# Patient Record
Sex: FEMALE | Race: WHITE
[De-identification: names, ages, dates, MRNs, and addresses within clinical notes are randomized per-mention and may not be internally consistent; named-entity substitution may affect disease eponyms.]

---

## 2019-07-15 ENCOUNTER — HOSPITAL ENCOUNTER (OUTPATIENT)
Dept: HOSPITAL 35 - BC | Age: 79
End: 2019-07-15
Attending: INTERNAL MEDICINE
Payer: COMMERCIAL

## 2019-07-15 DIAGNOSIS — Z12.31: Primary | ICD-10-CM

## 2019-11-21 ENCOUNTER — HOSPITAL ENCOUNTER (INPATIENT)
Dept: HOSPITAL 35 - 2N | Age: 79
LOS: 2 days | Discharge: HOME | DRG: 441 | End: 2019-11-23
Attending: INTERNAL MEDICINE | Admitting: INTERNAL MEDICINE
Payer: COMMERCIAL

## 2019-11-21 VITALS — SYSTOLIC BLOOD PRESSURE: 146 MMHG | DIASTOLIC BLOOD PRESSURE: 55 MMHG

## 2019-11-21 VITALS — BODY MASS INDEX: 24.11 KG/M2 | WEIGHT: 136.1 LBS | HEIGHT: 63 IN

## 2019-11-21 VITALS — DIASTOLIC BLOOD PRESSURE: 45 MMHG | SYSTOLIC BLOOD PRESSURE: 116 MMHG

## 2019-11-21 DIAGNOSIS — Z80.0: ICD-10-CM

## 2019-11-21 DIAGNOSIS — R18.8: ICD-10-CM

## 2019-11-21 DIAGNOSIS — R68.81: ICD-10-CM

## 2019-11-21 DIAGNOSIS — E43: ICD-10-CM

## 2019-11-21 DIAGNOSIS — K74.60: ICD-10-CM

## 2019-11-21 DIAGNOSIS — K75.81: Primary | ICD-10-CM

## 2019-11-21 DIAGNOSIS — Z88.2: ICD-10-CM

## 2019-11-21 DIAGNOSIS — R91.1: ICD-10-CM

## 2019-11-21 DIAGNOSIS — Z90.49: ICD-10-CM

## 2019-11-21 DIAGNOSIS — I10: ICD-10-CM

## 2019-11-21 DIAGNOSIS — E78.5: ICD-10-CM

## 2019-11-21 DIAGNOSIS — R63.0: ICD-10-CM

## 2019-11-21 DIAGNOSIS — R63.4: ICD-10-CM

## 2019-11-21 DIAGNOSIS — E78.00: ICD-10-CM

## 2019-11-21 DIAGNOSIS — Z82.49: ICD-10-CM

## 2019-11-21 DIAGNOSIS — I87.2: ICD-10-CM

## 2019-11-21 DIAGNOSIS — I45.10: ICD-10-CM

## 2019-11-21 DIAGNOSIS — Z96.642: ICD-10-CM

## 2019-11-21 DIAGNOSIS — E03.9: ICD-10-CM

## 2019-11-21 DIAGNOSIS — Z85.038: ICD-10-CM

## 2019-11-21 DIAGNOSIS — K21.9: ICD-10-CM

## 2019-11-21 DIAGNOSIS — Z88.8: ICD-10-CM

## 2019-11-21 DIAGNOSIS — Z90.710: ICD-10-CM

## 2019-11-21 LAB
ALBUMIN SERPL-MCNC: 2.2 G/DL (ref 3.4–5)
ALT SERPL-CCNC: 32 U/L (ref 30–65)
ANION GAP SERPL CALC-SCNC: 4 MMOL/L (ref 7–16)
AST SERPL-CCNC: 65 U/L (ref 15–37)
BASOPHILS NFR BLD AUTO: 1 % (ref 0–2)
BILIRUB DIRECT SERPL-MCNC: 0.7 MG/DL
BILIRUB SERPL-MCNC: 1.5 MG/DL
BUN SERPL-MCNC: 13 MG/DL (ref 7–18)
CALCIUM SERPL-MCNC: 7.9 MG/DL (ref 8.5–10.1)
CHLORIDE SERPL-SCNC: 97 MMOL/L (ref 98–107)
CO2 SERPL-SCNC: 34 MMOL/L (ref 21–32)
CREAT SERPL-MCNC: 0.7 MG/DL (ref 0.6–1)
EOSINOPHIL NFR BLD: 8 % (ref 0–3)
ERYTHROCYTE [DISTWIDTH] IN BLOOD BY AUTOMATED COUNT: 14.8 % (ref 10.5–14.5)
FERRITIN SERPL-MCNC: 362 NG/ML (ref 8–252)
GLUCOSE SERPL-MCNC: 90 MG/DL (ref 74–106)
GRANULOCYTES NFR BLD MANUAL: 52 % (ref 36–66)
HCT VFR BLD CALC: 40.8 % (ref 37–47)
HGB BLD-MCNC: 13.7 GM/DL (ref 12–15)
INR PPP: 1.2
IRON SERPL-MCNC: 78 UG/DL (ref 50–170)
LYMPHOCYTES NFR BLD AUTO: 23 % (ref 24–44)
MCH RBC QN AUTO: 34.4 PG (ref 26–34)
MCHC RBC AUTO-ENTMCNC: 33.6 G/DL (ref 28–37)
MCV RBC: 102.4 FL (ref 80–100)
MONOCYTES NFR BLD: 16 % (ref 1–8)
NEUTROPHILS # BLD: 3.5 THOU/UL (ref 1.4–8.2)
PLATELET # BLD: 164 THOU/UL (ref 150–400)
POTASSIUM SERPL-SCNC: 3.1 MMOL/L (ref 3.5–5.1)
PROT SERPL-MCNC: 7.2 G/DL (ref 6.4–8.2)
PROTHROMBIN TIME: 12.7 SECONDS (ref 9.3–11.4)
RBC # BLD AUTO: 3.98 MIL/UL (ref 4.2–5)
RBC MORPH BLD: NORMAL
SAO2 % BLD FROM PO2: 43 % (ref 20–39)
SODIUM SERPL-SCNC: 135 MMOL/L (ref 136–145)
TIBC SERPL-MCNC: 183 UG/DL (ref 250–450)
TSH SERPL-ACNC: 7.87 UIU/ML (ref 0.36–3.74)
WBC # BLD AUTO: 6.8 THOU/UL (ref 4–11)

## 2019-11-21 PROCEDURE — 10081 I&D PILONIDAL CYST COMP: CPT

## 2019-11-21 NOTE — NUR
80 YO FEMALE DIRECT ADMIT FROM DR. DE JESUS'S OFFICE TO ROOM 200. ADMISSION
ASSESSMENT COMPLETED AND CHARTED, ALERT AND ORIENTED X 4, NO COMPLAINTS OF
PAIN, SON AT BEDSIDE, NO NEEDS VOICED, WILL CONTINUE TO MONITOR

## 2019-11-22 VITALS — SYSTOLIC BLOOD PRESSURE: 102 MMHG | DIASTOLIC BLOOD PRESSURE: 51 MMHG

## 2019-11-22 VITALS — SYSTOLIC BLOOD PRESSURE: 101 MMHG | DIASTOLIC BLOOD PRESSURE: 41 MMHG

## 2019-11-22 VITALS — DIASTOLIC BLOOD PRESSURE: 52 MMHG | SYSTOLIC BLOOD PRESSURE: 134 MMHG

## 2019-11-22 VITALS — SYSTOLIC BLOOD PRESSURE: 123 MMHG | DIASTOLIC BLOOD PRESSURE: 56 MMHG

## 2019-11-22 VITALS — SYSTOLIC BLOOD PRESSURE: 105 MMHG | DIASTOLIC BLOOD PRESSURE: 54 MMHG

## 2019-11-22 LAB
ANION GAP SERPL CALC-SCNC: 4 MMOL/L (ref 7–16)
BF MACROPHAGE: 9
BF NEUTROPHILS: 12
BF NUCLEATED CELLS: 182
BF RBC: 370
BUN SERPL-MCNC: 12 MG/DL (ref 7–18)
CALCIUM SERPL-MCNC: 8.2 MG/DL (ref 8.5–10.1)
CERULOPLASMIN SERPL-MCNC: 29.5 MG/DL (ref 19–39)
CHLORIDE SERPL-SCNC: 99 MMOL/L (ref 98–107)
CLARITY UR: (no result)
CO2 SERPL-SCNC: 34 MMOL/L (ref 21–32)
COLOR UR: YELLOW
CREAT SERPL-MCNC: 0.6 MG/DL (ref 0.6–1)
GLUCOSE SERPL-MCNC: 83 MG/DL (ref 74–106)
HCV AB SER IA-ACNC: 0.1 (ref 0–0.9)
POTASSIUM SERPL-SCNC: 3.1 MMOL/L (ref 3.5–5.1)
SODIUM SERPL-SCNC: 137 MMOL/L (ref 136–145)
SOURCE: (no result)
SPECIMEN VOL 24H UR: 35 ML

## 2019-11-22 PROCEDURE — 0W9G3ZZ DRAINAGE OF PERITONEAL CAVITY, PERCUTANEOUS APPROACH: ICD-10-PCS | Performed by: RADIOLOGY

## 2019-11-22 NOTE — 2DMMODE
[facility]
[address]
[place]  [address]
Phone:  [phone] 2 D/M-MODE ECHOCARDIOGRAM     
_______________________________________________________________________________
 
Name:            ABDOULAYE KEANE CARLOS              Room #:        200-I       ADM IN
..#:           [id number]          Account #:     [id number]  
Admission:       19         Attend Phys:   Narciso Boland,
Discharge:                  Date of Birth: [birth year]  
                         Report #:      [id number]
        [id number]
_______________________________________________________________________________
THIS REPORT FOR:   //name//                          
 
 
--------------- APPROVED REPORT --------------
 
 
Study performed:  2019 06:21:27
 
EXAM: Limited 2D, Doppler, and color-flow 
Echocardiogram 
      Status:  routine
 
      BSA:         1.65
HR: 68 bpm BP:          102/51 mmHg 
Rhythm: NSR     
 
Other Information 
Study Quality: Good
 
Indications
Limited follow up echo for shortness of breath, leg edema. 
Hx: RBBB, HTN, HLP. (Complete echo done 10/18/19)
 
2D Dimensions
 LVOT Diam:  18.17 (18-24mm) 
 
Aortic Valve
AoV Peak Franky.:  2.19 m/s 
AO Peak Gr.:  19.18 mmHg LVOT Max P.63 mmHg
AO Mean Gr.:  11.27 mmHg 
AO V2 Mean:  1.60 m/s  LVOT Max V:  1.55 m/s
AO V2 VTI:  53.94 cm  
LAMAR Vmax: 1.84 cm2  
 
Tricuspid Valve
TR Peak Franky.:  2.72 m/s  RAP Estimate:  5.00 mmHg
TR Peak Gr.:  30.00 mmHg 
    PA Pressure:  35.00 mmHg
 
Left Ventricle
The left ventricle is normal size. Left ventricular systolic function 
is normal. LVEF is 65%.
 
Right Ventricle
The right ventricle is normal size. The right ventricular systolic 
function is normal.
 
 
[facility]
[address]
[place]  [address]
Phone:  [phone] 2 D/M-MODE ECHOCARDIOGRAM     
_______________________________________________________________________________
 
Name:            DIYAABDOULAYE CARLOS              Room #:        200-I       ADM IN
.R.#:           [id number]          Account #:     [id number]  
Admission:       19         Attend Phys:   Narciso Boland,
Discharge:                  Date of Birth: [birth year]  
                         Report #:      [id number]
        [id number]
_______________________________________________________________________________
 
Atria
Left atrium is dilated. The right atrium size is normal.
 
Aortic Valve
The aortic valve is moderately thickened and calcified. Mild stenosis 
Mild aortic regurgitation. There is mild valvular aortic stenosis. 
Calculated aortic valve area is 1.8 cm2 with maximum pressure 
gradient of 19 mmHg and mean pressure gradient of 11 mmHg.
 
Mitral Valve
Moderate mitral annular calcification. Mild-moderate mitral 
regurgitation.
 
Tricuspid Valve
The tricuspid valve is normal in structure. Mild to moderate 
tricuspid regurgitation. Estimated PAP is 35mmHg.
 
Great Vessels
IVC is normal in size and collapses >50% with 
inspiration.
 
Pericardium
There is no pericardial effusion.
 
<Conclusion>
Abbreviated study
Left ventricular systolic function is normal.
LVEF is 65%.
The aortic valve is moderately thickened and calcified.  Mild 
stenosis and insufficiency
Moderate mitral annular calcification. Mild-moderate mitral 
regurgitation.
There is no pericardial effusion.
 
 
 
 
 
 
 
 
 
 
  <ELECTRONICALLY SIGNED>
   By: Ricardo Daley MD, [facility]   
  1946
D: 19                           _____________________________________
T: 19                           Ricardo Daley MD, [facility]     /INF

## 2019-11-22 NOTE — NUR
TOOK OVER CARE OF PATIENT AROUND 2000. PATIENT RESTING IN BED. DENIED PAIN.
GAVE PATIENT CUP FOR HEARING AIDES. ASCITIES AND LOWER EXTREMITES EDEMA.
PATIENT WAS OBSERVED HAVING BRP, PATIENT WAS STEADY ON HER FEET, SCORED 20 ON
FALL SCALE, TOOK HER OUT OF YELLOWS. PLAN OF CARE IS TO POSSIBLY HAVE A
PARACENTESIS TODAY. PATIENT HAS BEEN NPO SINCE MIDNIGHT.

## 2019-11-22 NOTE — NUR
Chart reviewed and case discussed with the care team. Pt was tapped today. Up
ad josiah and steady on her feet per nursing. Hem/Onc consult pending. Pt has
insurance in place for f/u care. She is  within the past few months and
her son is involved with her care. No cm needs identified at this time. Will
remain available should needs arise.

## 2019-11-22 NOTE — NUR
PT REMAINED PAIN FREE THIS SHIFT. PT IS TO BE NPO AFTER MIDNIGHT FOR US ABD +
DOPPLER ABD/PELVIS IN AM. PT'S K REPLACED TODAY, AND CMP ORDERED FOR AM. PT UP
AD BERTHA IN ROOM, STEADY ON FEET. PT CALLS APPROPRIATELY.

## 2019-11-22 NOTE — NUR
PT WENT FOR US GUIDED PARACENTESIS THIS MORNING. PT IS BACK IN ROOM. ABD IS
NON-TENDER TO TOUCH. NEW BANDAID IS PLACED ON R ABD. WILL CONTINUE TO MONITOR
PT.

## 2019-11-22 NOTE — EKG
[place] [address] [facility] [address]
[place]  [address]
Phone:  [phone]                    ELECTROCARDIOGRAM REPORT      
_______________________________________________________________________________
 
Name:       MARKOS KEANEDIOR GONZALEZ              Room #:         200-I       ADM IN  
M.R.#:      [id number]     Account #:      [id number]  
Admission:  19    Attend Phys:    Narciso Boland MD,
Discharge:              Date of Birth:  [birth year]  
                                                          Report #: [id number]
   [id number]
_______________________________________________________________________________
THIS REPORT FOR:   //name//                          
 
                          [facility]
                                       
Test Date:    2019               Test Time:    07:39:17
Pat Name:     ABDOULAYE KEANE             Department:   
Patient ID:   [id number]            Room:         200 I
Gender:       F                        Technician:   JOSE
:          1940               Requested By: Daniella Tobin
Order Number: [id number] MD:   Ricardo Daley
                                 Measurements
Intervals                              Axis          
Rate:         70                       P:            19
KY:           101                      QRS:          -2
QRSD:         115                      T:            -14
QT:           448                                    
QTc:          484                                    
                           Interpretive Statements
Sinus rhythm
Short KY interval
Incomplete right bundle branch block
Compared to ECG 10/22/1998 06:41:00
Right bundle branch block is now present
Electronically Signed On 2019 9:25:12 CST by Ricardo Daley
[email or web address]
 
 
 
 
 
 
 
 
 
 
 
 
 
 
 
 
 
 
  <ELECTRONICALLY SIGNED>
   By: Ricardo Daley MD, [facility]   
  19     0925
D: 19 0739                           _____________________________________
T: 19 0739                           Ricardo Daley MD, [facility]     /EPI

## 2019-11-23 VITALS — DIASTOLIC BLOOD PRESSURE: 43 MMHG | SYSTOLIC BLOOD PRESSURE: 107 MMHG

## 2019-11-23 VITALS — SYSTOLIC BLOOD PRESSURE: 116 MMHG | DIASTOLIC BLOOD PRESSURE: 46 MMHG

## 2019-11-23 VITALS — SYSTOLIC BLOOD PRESSURE: 107 MMHG | DIASTOLIC BLOOD PRESSURE: 43 MMHG

## 2019-11-23 VITALS — DIASTOLIC BLOOD PRESSURE: 51 MMHG | SYSTOLIC BLOOD PRESSURE: 141 MMHG

## 2019-11-23 LAB
ALBUMIN SERPL-MCNC: 1.7 G/DL (ref 3.4–5)
ALT SERPL-CCNC: 24 U/L (ref 30–65)
ANION GAP SERPL CALC-SCNC: 4 MMOL/L (ref 7–16)
AST SERPL-CCNC: 49 U/L (ref 15–37)
BILIRUB SERPL-MCNC: 1.4 MG/DL
BUN SERPL-MCNC: 13 MG/DL (ref 7–18)
CALCIUM SERPL-MCNC: 8.2 MG/DL (ref 8.5–10.1)
CHLORIDE SERPL-SCNC: 101 MMOL/L (ref 98–107)
CO2 SERPL-SCNC: 32 MMOL/L (ref 21–32)
CREAT SERPL-MCNC: 0.6 MG/DL (ref 0.6–1)
GLUCOSE SERPL-MCNC: 80 MG/DL (ref 74–106)
POTASSIUM SERPL-SCNC: 3.4 MMOL/L (ref 3.5–5.1)
PROT SERPL-MCNC: 5.6 G/DL (ref 6.4–8.2)
SODIUM SERPL-SCNC: 137 MMOL/L (ref 136–145)

## 2019-11-23 NOTE — NUR
ASSESSMENTS AS CHARTED. MEDS AS CHARTED. PATIENT RELAXING IN BED DURING SHIFT.
SINUS RHYTHM WITH BBB ON TELEMETRY, ON ROOM AIR, HAS AN IRRITATING COUGH. NPO
SINCE MIDNIGHT FOR ULTRA SOUND OF ABDOMEN/PELVIS SCHEDULED FOR LATER TODAY.
YESTERDAY SHE HAD PARACENTESIS VIA RIGHT SIDE AND TOOK 1 LITER OFF. ABDOMEN
STILL FIRM AT START OF SHIFT. PLAN OF CARE IS TO CONTINUE CARE ON AN
OUTPATIENT BASIS WITH FOLLOWING DOCTORS. DENIES PAIN. PATIENT UP AT BERTHA IN
ROOM.

## 2019-11-23 NOTE — NUR
AAOX4. BEING DISCHARGED TO HOME TODAY. FOLLOWING UP WITH ONCOLOGY AND GI.
WISHES TO SPEAK TO GI BEFORE DISCHARGE. SR/SB PER TELE. ADULT SON AT BEDSIDE.
WILL CONTINUE TO FOLLOW CLOSELY.

## 2019-11-24 LAB
ALBUMIN FLD-MCNC: 0.3 G/DL
AMYLASE FLD-CCNC: 17 U/L
BODY FLUID LDH: 60 IU/L
GLUCOSE FLD-MCNC: 88 MG/DL
MITOCHONDRIAL ANTIBODY: <20 UNITS (ref 0–20)
PROT FLD-MCNC: 1 G/DL
SMOOTH MUSCLE ANTIBODY: 65 UNITS (ref 0–19)

## 2019-11-25 LAB — SOURCE: (no result)
